# Patient Record
Sex: MALE | Race: WHITE
[De-identification: names, ages, dates, MRNs, and addresses within clinical notes are randomized per-mention and may not be internally consistent; named-entity substitution may affect disease eponyms.]

---
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
## 2024-05-22 NOTE — ULT
TESTICULAR ULTRASOUND:

 

HISTORY:

Testicular trauma.

 

COMPARISON:

None.

 

TECHNIQUE:

Gray-scale, color-flow, Doppler imaging, and spectral wave-form analysis was performed of the left an
d right testicles.

 

FINDINGS:

RIGHT IMAGED SCROTUM:  The right testicle has a normal echotexture.  No intratesticular masses.  The 
right testicle measures 4.1 x 2.6 x 2.4 cm.  The right epididymis has a normal echotexture, measuring
 0.7 x 0.9 x 1.2 cm.  No significant fluid in the right hemiscrotum.

 

LEFT TESTICLE:  The left testicle has a homogeneous echotexture.  No intratesticular masses.  The lef
t testicle measures 4.2 x 2.5 x 2.7 cm.  The left epididymis has a normal echotexture, measuring 1.1 
x 0.8 cm.

 

No significant fluid in the left hemiscrotum.

 

TESTICULAR DOPPLER:  Vascular flow to both testicles.

 

IMPRESSION:

No posttraumatic change in the left or right scrotum.

 

POS: [facility] Colitis Colitis